# Patient Record
Sex: FEMALE | URBAN - METROPOLITAN AREA
[De-identification: names, ages, dates, MRNs, and addresses within clinical notes are randomized per-mention and may not be internally consistent; named-entity substitution may affect disease eponyms.]

---

## 2024-02-07 ENCOUNTER — OV NP (OUTPATIENT)
Dept: URBAN - METROPOLITAN AREA CLINIC 92 | Facility: CLINIC | Age: 54
End: 2024-02-07
Payer: COMMERCIAL

## 2024-02-07 ENCOUNTER — LAB (OUTPATIENT)
Dept: URBAN - METROPOLITAN AREA CLINIC 92 | Facility: CLINIC | Age: 54
End: 2024-02-07

## 2024-02-07 VITALS
SYSTOLIC BLOOD PRESSURE: 124 MMHG | BODY MASS INDEX: 26.5 KG/M2 | HEART RATE: 77 BPM | TEMPERATURE: 97.3 F | DIASTOLIC BLOOD PRESSURE: 75 MMHG | WEIGHT: 144 LBS | HEIGHT: 62 IN

## 2024-02-07 DIAGNOSIS — K50.012 CROHN'S DISEASE OF SMALL INTESTINE WITH INTESTINAL OBSTRUCTION: ICD-10-CM

## 2024-02-07 DIAGNOSIS — Z12.11 COLON CANCER SCREENING: ICD-10-CM

## 2024-02-07 PROBLEM — 56689002: Status: ACTIVE | Noted: 2024-02-07

## 2024-02-07 PROCEDURE — 99205 OFFICE O/P NEW HI 60 MIN: CPT | Performed by: INTERNAL MEDICINE

## 2024-02-07 RX ORDER — AZATHIOPRINE 50 MG/1
1.5 TABLETS TABLET ORAL DAILY
Qty: 135 TABLET | Refills: 0 | OUTPATIENT
Start: 2024-02-07 | End: 2024-05-07

## 2024-02-07 RX ORDER — AZATHIOPRINE 75 MG/1
AS DIRECTED TABLET ORAL DAILY
Qty: 90 | Refills: 0 | OUTPATIENT
Start: 2024-02-07 | End: 2024-05-07

## 2024-02-07 RX ORDER — AZATHIOPRINE 75 MG/1
TABLET ORAL
Qty: 30 TABLET | Status: ACTIVE | COMMUNITY

## 2024-02-07 RX ORDER — LATANOPROST 50 UG/ML
SOLUTION/ DROPS OPHTHALMIC
Qty: 2.5 MILLILITER | Status: ACTIVE | COMMUNITY

## 2024-02-07 RX ORDER — POLYETHYLENE GLYCOL-3350 AND ELECTROLYTES WITH FLAVOR PACK 240; 5.84; 2.98; 6.72; 22.72 G/278.26G; G/278.26G; G/278.26G; G/278.26G; G/278.26G
4000 ML POWDER, FOR SOLUTION ORAL ONCE
Qty: 4000 ML | Refills: 0 | OUTPATIENT
Start: 2024-02-07

## 2024-02-07 RX ORDER — AMOXICILLIN 500 MG/1
TABLET, FILM COATED ORAL
Qty: 30 TABLET | Status: ON HOLD | COMMUNITY

## 2024-02-07 NOTE — HPI-TODAY'S VISIT:
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  - Dx CD 8/2001 - thought she had a mass on the ovaries, but was found to have CD s/p "a foot of terminal ileum" resection - Has been hospitalized intermittently (lived in [facility]) - has only been in [facility] fot the past 2 years - Had another SB resection in 2/2019, resected 3 feet of small bowel with creation of neoterminal ielum - at that time she was on AZA and prednisone  - Was on prednisone low dose until earlier this year - now having aches in the feet, knees. Was tested for RA which was negative. Has an appt with Rheumatology.  - Maintained on AZA 75mg daily, has been at this dose for the past 3 months - Is having intermittent abdominal pain, RUQ - this is where she typically has her issues - Tried Remicade (rx'd in [facility]) - would only get dosed every 6 months or so; has not had in years  - Has also tried Pentasa - Has never had any inflammation in the colon - She has had colonoscopy in the past which demonstrated inflammatory polyp, thinks her last one was about 6 years ago  - No CRC in the family - No blood in the stool  - No recent imaging  - FCP was high recently - BMs are alternating between constipation and diarrhea

## 2024-02-15 LAB — CALPROTECTIN, FECAL: 43

## 2024-02-19 LAB
6 MMP: <500
6 TG: 182
A/G RATIO: 1.4
ABSOLUTE BASOPHILS: 63
ABSOLUTE EOSINOPHILS: 68
ABSOLUTE LYMPHOCYTES: 1590
ABSOLUTE MONOCYTES: 542
ABSOLUTE NEUTROPHILS: 3437
ALBUMIN: 3.9
ALKALINE PHOSPHATASE: 69
ALT (SGPT): 24
AST (SGOT): 25
BASOPHILS: 1.1
BILIRUBIN, TOTAL: 0.3
BUN/CREATININE RATIO: (no result)
BUN: 13
C-REACTIVE PROTEIN, QUANT: 0.6
CALCIUM: 9.1
CARBON DIOXIDE, TOTAL: 25
CHLORIDE: 107
CREATININE: 0.75
EGFR: 96
EOSINOPHILS: 1.2
FERRITIN, SERUM: 24
GLOBULIN, TOTAL: 2.8
GLUCOSE: 93
HEMATOCRIT: 35.1
HEMOGLOBIN: 11
HEPATITIS B CORE AB TOTAL: (no result)
HEPATITIS B SURFACE ANTIBODY QL: (no result)
HEPATITIS B SURFACE ANTIGEN: (no result)
INTERPRETATION: (no result)
IRON BIND.CAP.(TIBC): 304
IRON SATURATION: 28
IRON: 85
LYMPHOCYTES: 27.9
Lab: NEGATIVE
MCH: 22.5
MCHC: 31.3
MCV: 71.9
MITOGEN-NIL: >10
MONOCYTES: 9.5
MPV: 12.4
NEUTROPHILS: 60.3
PLATELET COUNT: 249
POTASSIUM: 3.6
PROTEIN, TOTAL: 6.7
QUANTIFERON NIL VALUE: 0.04
QUANTIFERON TB1 AG VALUE: 0.03
QUANTIFERON TB2 AG VALUE: 0
QUANTIFERON-TB GOLD PLUS: NEGATIVE
RDW: 15.9
RED BLOOD CELL COUNT: 4.88
SODIUM: 141
VITAMIN B12: 440
VITAMIN D,25-OH,TOTAL,IA: 31
WHITE BLOOD CELL COUNT: 5.7

## 2024-03-08 ENCOUNTER — LAB (OUTPATIENT)
Dept: URBAN - METROPOLITAN AREA CLINIC 4 | Facility: CLINIC | Age: 54
End: 2024-03-08
Payer: COMMERCIAL

## 2024-03-08 ENCOUNTER — COLON (OUTPATIENT)
Dept: URBAN - METROPOLITAN AREA SURGERY CENTER 16 | Facility: SURGERY CENTER | Age: 54
End: 2024-03-08
Payer: COMMERCIAL

## 2024-03-08 DIAGNOSIS — K52.89 OTHER SPECIFIED NONINFECTIVE GASTROENTERITIS AND COLITIS: ICD-10-CM

## 2024-03-08 DIAGNOSIS — K63.3 ULCER OF INTESTINE: ICD-10-CM

## 2024-03-08 PROCEDURE — 88305 TISSUE EXAM BY PATHOLOGIST: CPT | Performed by: PATHOLOGY

## 2024-03-08 PROCEDURE — 88342 IMHCHEM/IMCYTCHM 1ST ANTB: CPT | Performed by: PATHOLOGY

## 2024-03-08 PROCEDURE — 45380 COLONOSCOPY AND BIOPSY: CPT | Performed by: INTERNAL MEDICINE

## 2024-03-08 PROCEDURE — 88341 IMHCHEM/IMCYTCHM EA ADD ANTB: CPT | Performed by: PATHOLOGY

## 2024-03-08 RX ORDER — LATANOPROST 50 UG/ML
SOLUTION/ DROPS OPHTHALMIC
Qty: 2.5 MILLILITER | Status: ACTIVE | COMMUNITY

## 2024-03-08 RX ORDER — AZATHIOPRINE 50 MG/1
1.5 TABLETS TABLET ORAL DAILY
Qty: 135 TABLET | Refills: 0 | Status: ACTIVE | COMMUNITY
Start: 2024-02-07 | End: 2024-05-07

## 2024-03-08 RX ORDER — AMOXICILLIN 500 MG/1
TABLET, FILM COATED ORAL
Qty: 30 TABLET | Status: ON HOLD | COMMUNITY

## 2024-03-08 RX ORDER — POLYETHYLENE GLYCOL-3350 AND ELECTROLYTES WITH FLAVOR PACK 240; 5.84; 2.98; 6.72; 22.72 G/278.26G; G/278.26G; G/278.26G; G/278.26G; G/278.26G
4000 ML POWDER, FOR SOLUTION ORAL ONCE
Qty: 4000 ML | Refills: 0 | Status: ACTIVE | COMMUNITY
Start: 2024-02-07

## 2024-03-08 RX ORDER — AZATHIOPRINE 75 MG/1
AS DIRECTED TABLET ORAL DAILY
Qty: 90 | Refills: 0 | Status: ACTIVE | COMMUNITY
Start: 2024-02-07 | End: 2024-05-07

## 2024-03-08 RX ORDER — AZATHIOPRINE 75 MG/1
TABLET ORAL
Qty: 30 TABLET | Status: ACTIVE | COMMUNITY

## 2024-03-20 ENCOUNTER — OV EP (OUTPATIENT)
Dept: URBAN - METROPOLITAN AREA CLINIC 92 | Facility: CLINIC | Age: 54
End: 2024-03-20
Payer: COMMERCIAL

## 2024-03-20 VITALS
WEIGHT: 140 LBS | HEIGHT: 62 IN | HEART RATE: 70 BPM | DIASTOLIC BLOOD PRESSURE: 66 MMHG | BODY MASS INDEX: 25.76 KG/M2 | TEMPERATURE: 96.8 F | SYSTOLIC BLOOD PRESSURE: 115 MMHG

## 2024-03-20 DIAGNOSIS — K50.012 CROHN'S DISEASE OF SMALL INTESTINE WITH INTESTINAL OBSTRUCTION: ICD-10-CM

## 2024-03-20 PROCEDURE — 99215 OFFICE O/P EST HI 40 MIN: CPT | Performed by: INTERNAL MEDICINE

## 2024-03-20 RX ORDER — AZATHIOPRINE 50 MG/1
1.5 TABLETS TABLET ORAL DAILY
Qty: 135 TABLET | Refills: 0 | Status: ACTIVE | COMMUNITY
Start: 2024-02-07 | End: 2024-05-07

## 2024-03-20 RX ORDER — LATANOPROST 50 UG/ML
SOLUTION/ DROPS OPHTHALMIC
Qty: 2.5 MILLILITER | Status: ACTIVE | COMMUNITY

## 2024-03-20 RX ORDER — USTEKINUMAB 90 MG/ML
AS DIRECTED INJECTION, SOLUTION SUBCUTANEOUS
Qty: 3 UNSPECIFIED | Refills: 3 | OUTPATIENT
Start: 2024-03-20 | End: 2025-03-15

## 2024-03-20 RX ORDER — AZATHIOPRINE 75 MG/1
TABLET ORAL
Qty: 30 TABLET | Status: ACTIVE | COMMUNITY

## 2024-03-20 RX ORDER — USTEKINUMAB 130 MG/26ML
AS DIRECTED SOLUTION INTRAVENOUS ONCE
Qty: 390 MILLIGRAMS | Refills: 0 | OUTPATIENT
Start: 2024-03-20 | End: 2024-03-21

## 2024-03-20 RX ORDER — POLYETHYLENE GLYCOL-3350 AND ELECTROLYTES WITH FLAVOR PACK 240; 5.84; 2.98; 6.72; 22.72 G/278.26G; G/278.26G; G/278.26G; G/278.26G; G/278.26G
4000 ML POWDER, FOR SOLUTION ORAL ONCE
Qty: 4000 ML | Refills: 0 | Status: ON HOLD | COMMUNITY
Start: 2024-02-07

## 2024-03-20 RX ORDER — AMOXICILLIN 500 MG/1
TABLET, FILM COATED ORAL
Qty: 30 TABLET | Status: ON HOLD | COMMUNITY

## 2024-03-20 RX ORDER — AZATHIOPRINE 75 MG/1
AS DIRECTED TABLET ORAL DAILY
Qty: 90 | Refills: 0 | Status: ACTIVE | COMMUNITY
Start: 2024-02-07 | End: 2024-05-07

## 2024-03-20 NOTE — HPI-TODAY'S VISIT:
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  - Dx CD 8/2001 - thought she had a mass on the ovaries, but was found to have CD s/p "a foot of terminal ileum" resection - Has been hospitalized intermittently (lived in [facility]) - has only been in [facility] fot the past 2 years - Had another SB resection in 2/2019, resected 3 feet of small bowel with creation of neoterminal ielum - at that time she was on AZA and prednisone  - Was on prednisone low dose until earlier this year - now having aches in the feet, knees. Was tested for RA which was negative. Has an appt with Rheumatology.  - Maintained on AZA 75mg daily, has been at this dose for the past 3 months - Is having intermittent abdominal pain, RUQ - this is where she typically has her issues - Tried Remicade (rx'd in [facility]) - would only get dosed every 6 months or so; has not had in years  - Has also tried Pentasa - Has never had any inflammation in the colon - She has had colonoscopy in the past which demonstrated inflammatory polyp, thinks her last one was about 6 years ago  - No CRC in the family - No blood in the stool  - No recent imaging  - FCP was high recently - BMs are alternating between constipation and diarrhea  ***3/2024: Colonoscopy March 2024 demonstrated normal mucosa in the colon, but a ulcerated ileocolonic anastomosis as well as a neoterminal ileum with multiple ulcers, spanning about 5 to 10 cm proximal to the anastomosis with a more proximal TI appearing normal.  Biopsies were negative and TI biopsies demonstrated patchy chronic ileitis with ulcer.  CT enterography February 2024 demonstrated status post ileocecectomy with an ileocolic anastomosis at the level of the ascending colon with mild wall thickening of the ascending colon at the anastomosis otherwise no evidence of active inflammation in the small or large bowel.  Also demonstrated a possible small sinus tract in the right mid abdomen versus postsurgical change.  Blood work demonstrated a hemoglobin of 11.0.  CRP 0.6.  Ferritin 24.  Not immune to hepatitis B.  6-TG level was 182.  Currently having 0-4 stools per day. Mild to moderate abdominal pain. No blood. No NV.  Notes a hx of dysphagia requiring dilation last year, none currently.

## 2024-04-15 ENCOUNTER — STI (OUTPATIENT)
Dept: URBAN - METROPOLITAN AREA CLINIC 91 | Facility: CLINIC | Age: 54
End: 2024-04-15
Payer: COMMERCIAL

## 2024-04-15 VITALS
DIASTOLIC BLOOD PRESSURE: 98 MMHG | SYSTOLIC BLOOD PRESSURE: 141 MMHG | TEMPERATURE: 97.7 F | HEIGHT: 62 IN | HEART RATE: 75 BPM | BODY MASS INDEX: 25.65 KG/M2 | WEIGHT: 139.4 LBS

## 2024-04-15 DIAGNOSIS — K50.80 CROHN'S COLITIS: ICD-10-CM

## 2024-04-15 PROCEDURE — 96375 TX/PRO/DX INJ NEW DRUG ADDON: CPT | Performed by: INTERNAL MEDICINE

## 2024-04-15 PROCEDURE — 96413 CHEMO IV INFUSION 1 HR: CPT | Performed by: INTERNAL MEDICINE

## 2024-04-15 RX ORDER — AZATHIOPRINE 50 MG/1
1.5 TABLETS TABLET ORAL DAILY
Qty: 135 TABLET | Refills: 0 | Status: ACTIVE | COMMUNITY
Start: 2024-02-07 | End: 2024-05-07

## 2024-04-15 RX ORDER — LATANOPROST 50 UG/ML
SOLUTION/ DROPS OPHTHALMIC
Qty: 2.5 MILLILITER | Status: ACTIVE | COMMUNITY

## 2024-04-15 RX ORDER — POLYETHYLENE GLYCOL-3350 AND ELECTROLYTES WITH FLAVOR PACK 240; 5.84; 2.98; 6.72; 22.72 G/278.26G; G/278.26G; G/278.26G; G/278.26G; G/278.26G
4000 ML POWDER, FOR SOLUTION ORAL ONCE
Qty: 4000 ML | Refills: 0 | Status: ON HOLD | COMMUNITY
Start: 2024-02-07

## 2024-04-15 RX ORDER — AMOXICILLIN 500 MG/1
TABLET, FILM COATED ORAL
Qty: 30 TABLET | Status: ON HOLD | COMMUNITY

## 2024-04-15 RX ORDER — AZATHIOPRINE 75 MG/1
AS DIRECTED TABLET ORAL DAILY
Qty: 90 | Refills: 0 | Status: ACTIVE | COMMUNITY
Start: 2024-02-07 | End: 2024-05-07

## 2024-04-15 RX ORDER — AZATHIOPRINE 75 MG/1
TABLET ORAL
Qty: 30 TABLET | Status: ACTIVE | COMMUNITY

## 2024-04-15 RX ORDER — USTEKINUMAB 90 MG/ML
AS DIRECTED INJECTION, SOLUTION SUBCUTANEOUS
Qty: 3 UNSPECIFIED | Refills: 3 | Status: ACTIVE | COMMUNITY
Start: 2024-03-20 | End: 2025-03-15

## 2024-05-08 ENCOUNTER — DASHBOARD ENCOUNTERS (OUTPATIENT)
Age: 54
End: 2024-05-08

## 2024-05-08 ENCOUNTER — OFFICE VISIT (OUTPATIENT)
Dept: URBAN - METROPOLITAN AREA CLINIC 92 | Facility: CLINIC | Age: 54
End: 2024-05-08
Payer: COMMERCIAL

## 2024-05-08 VITALS
HEART RATE: 67 BPM | DIASTOLIC BLOOD PRESSURE: 81 MMHG | SYSTOLIC BLOOD PRESSURE: 126 MMHG | HEIGHT: 62 IN | TEMPERATURE: 97.2 F | WEIGHT: 143.6 LBS | BODY MASS INDEX: 26.43 KG/M2

## 2024-05-08 DIAGNOSIS — K50.012 CROHN'S DISEASE OF SMALL INTESTINE WITH INTESTINAL OBSTRUCTION: ICD-10-CM

## 2024-05-08 PROCEDURE — 99214 OFFICE O/P EST MOD 30 MIN: CPT | Performed by: INTERNAL MEDICINE

## 2024-05-08 RX ORDER — POLYETHYLENE GLYCOL-3350 AND ELECTROLYTES WITH FLAVOR PACK 240; 5.84; 2.98; 6.72; 22.72 G/278.26G; G/278.26G; G/278.26G; G/278.26G; G/278.26G
4000 ML POWDER, FOR SOLUTION ORAL ONCE
Qty: 4000 ML | Refills: 0 | Status: ON HOLD | COMMUNITY
Start: 2024-02-07

## 2024-05-08 RX ORDER — LATANOPROST 50 UG/ML
SOLUTION/ DROPS OPHTHALMIC
Qty: 2.5 MILLILITER | Status: ACTIVE | COMMUNITY

## 2024-05-08 RX ORDER — AZATHIOPRINE 75 MG/1
TABLET ORAL
Qty: 30 TABLET | Status: ACTIVE | COMMUNITY

## 2024-05-08 RX ORDER — USTEKINUMAB 90 MG/ML
AS DIRECTED INJECTION, SOLUTION SUBCUTANEOUS
Qty: 3 UNSPECIFIED | Refills: 3 | OUTPATIENT

## 2024-05-08 RX ORDER — AMOXICILLIN 500 MG/1
TABLET, FILM COATED ORAL
Qty: 30 TABLET | Status: ON HOLD | COMMUNITY

## 2024-05-08 RX ORDER — USTEKINUMAB 90 MG/ML
AS DIRECTED INJECTION, SOLUTION SUBCUTANEOUS
Qty: 3 UNSPECIFIED | Refills: 3 | Status: ACTIVE | COMMUNITY
Start: 2024-03-20 | End: 2025-03-15

## 2024-05-21 LAB
A/G RATIO: 1.5
ABSOLUTE BASOPHILS: 48
ABSOLUTE EOSINOPHILS: 10
ABSOLUTE LYMPHOCYTES: 2150
ABSOLUTE MONOCYTES: 653
ABSOLUTE NEUTROPHILS: 6739
ALBUMIN: 4.5
ALKALINE PHOSPHATASE: 71
ALT (SGPT): 32
AST (SGOT): 30
BASOPHILS: 0.5
BILIRUBIN, TOTAL: 0.6
BUN/CREATININE RATIO: (no result)
BUN: 15
C-REACTIVE PROTEIN, QUANT: <3
CALCIUM: 9.3
CALPROTECTIN, FECAL: 106
CARBON DIOXIDE, TOTAL: 25
CHLORIDE: 103
CREATININE: 0.72
EGFR: 101
EOSINOPHILS: 0.1
GLOBULIN, TOTAL: 3
GLUCOSE: 84
HEMATOCRIT: 39.5
HEMOGLOBIN: 12
LYMPHOCYTES: 22.4
MCH: 22.3
MCHC: 30.4
MCV: 73.3
MONOCYTES: 6.8
MPV: 12.6
NEUTROPHILS: 70.2
PLATELET COUNT: 240
POTASSIUM: 3.8
PROTEIN, TOTAL: 7.5
RDW: 17.4
RED BLOOD CELL COUNT: 5.39
SODIUM: 139
WHITE BLOOD CELL COUNT: 9.6

## 2024-07-10 ENCOUNTER — OFFICE VISIT (OUTPATIENT)
Dept: URBAN - METROPOLITAN AREA CLINIC 92 | Facility: CLINIC | Age: 54
End: 2024-07-10
Payer: COMMERCIAL

## 2024-07-10 VITALS
TEMPERATURE: 96.6 F | SYSTOLIC BLOOD PRESSURE: 127 MMHG | BODY MASS INDEX: 26.68 KG/M2 | HEART RATE: 64 BPM | DIASTOLIC BLOOD PRESSURE: 79 MMHG | HEIGHT: 62 IN | WEIGHT: 145 LBS

## 2024-07-10 DIAGNOSIS — K50.012 CROHN'S DISEASE OF SMALL INTESTINE WITH INTESTINAL OBSTRUCTION: ICD-10-CM

## 2024-07-10 PROCEDURE — 99214 OFFICE O/P EST MOD 30 MIN: CPT | Performed by: INTERNAL MEDICINE

## 2024-07-10 RX ORDER — LATANOPROST 50 UG/ML
SOLUTION/ DROPS OPHTHALMIC
Qty: 2.5 MILLILITER | Status: ACTIVE | COMMUNITY

## 2024-07-10 RX ORDER — AMOXICILLIN 500 MG/1
TABLET, FILM COATED ORAL
Qty: 30 TABLET | Status: ON HOLD | COMMUNITY

## 2024-07-10 RX ORDER — USTEKINUMAB 90 MG/ML
AS DIRECTED INJECTION, SOLUTION SUBCUTANEOUS
Qty: 3 UNSPECIFIED | Refills: 3 | OUTPATIENT

## 2024-07-10 RX ORDER — USTEKINUMAB 90 MG/ML
AS DIRECTED INJECTION, SOLUTION SUBCUTANEOUS
Qty: 3 UNSPECIFIED | Refills: 3 | Status: ACTIVE | COMMUNITY

## 2024-07-10 RX ORDER — POLYETHYLENE GLYCOL-3350 AND ELECTROLYTES WITH FLAVOR PACK 240; 5.84; 2.98; 6.72; 22.72 G/278.26G; G/278.26G; G/278.26G; G/278.26G; G/278.26G
4000 ML POWDER, FOR SOLUTION ORAL ONCE
Qty: 4000 ML | Refills: 0 | Status: ON HOLD | COMMUNITY
Start: 2024-02-07

## 2024-07-10 RX ORDER — AZATHIOPRINE 75 MG/1
TABLET ORAL
Qty: 30 TABLET | Status: ACTIVE | COMMUNITY

## 2024-07-10 NOTE — HPI-TODAY'S VISIT:
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  - Dx CD 8/2001 - thought she had a mass on the ovaries, but was found to have CD s/p "a foot of terminal ileum" resection - Has been hospitalized intermittently (lived in [facility]) - has only been in [facility] fot the past 2 years - Had another SB resection in 2/2019, resected 3 feet of small bowel with creation of neoterminal ielum - at that time she was on AZA and prednisone  - Was on prednisone low dose until earlier this year - now having aches in the feet, knees. Was tested for RA which was negative. Has an appt with Rheumatology.  - Maintained on AZA 75mg daily, has been at this dose for the past 3 months - Is having intermittent abdominal pain, RUQ - this is where she typically has her issues - Tried Remicade (rx'd in [facility]) - would only get dosed every 6 months or so; has not had in years  - Has also tried Pentasa - Has never had any inflammation in the colon - She has had colonoscopy in the past which demonstrated inflammatory polyp, thinks her last one was about 6 years ago  - No CRC in the family - No blood in the stool  - No recent imaging  - FCP was high recently - BMs are alternating between constipation and diarrhea  ***3/2024: Colonoscopy March 2024 demonstrated normal mucosa in the colon, but a ulcerated ileocolonic anastomosis as well as a neoterminal ileum with multiple ulcers, spanning about 5 to 10 cm proximal to the anastomosis with a more proximal TI appearing normal.  Biopsies were negative and TI biopsies demonstrated patchy chronic ileitis with ulcer.  CT enterography February 2024 demonstrated status post ileocecectomy with an ileocolic anastomosis at the level of the ascending colon with mild wall thickening of the ascending colon at the anastomosis otherwise no evidence of active inflammation in the small or large bowel.  Also demonstrated a possible small sinus tract in the right mid abdomen versus postsurgical change.  Blood work demonstrated a hemoglobin of 11.0.  CRP 0.6.  Ferritin 24.  Not immune to hepatitis B.  6-TG level was 182.  Currently having 0-4 stools per day. Mild to moderate abdominal pain. No blood. No NV.  Notes a hx of dysphagia requiring dilation last year, none currently.  ***5/2024: Started Stelara 4/2024, had her infusion and will start maintenance in June 2024. Overall feels fatigued, having some diarrhea. Injured her hand at work.  ***7/2024: Doing well on Stelara, improving her abdominal pain. No diarrhea. Having hand issues which are frustraing her. Off AZA.

## 2024-08-12 ENCOUNTER — OFFICE VISIT (OUTPATIENT)
Dept: URBAN - METROPOLITAN AREA CLINIC 92 | Facility: CLINIC | Age: 54
End: 2024-08-12

## 2025-01-07 ENCOUNTER — OFFICE VISIT (OUTPATIENT)
Dept: URBAN - METROPOLITAN AREA CLINIC 92 | Facility: CLINIC | Age: 55
End: 2025-01-07
Payer: COMMERCIAL

## 2025-01-07 VITALS — HEIGHT: 62 IN | BODY MASS INDEX: 27.6 KG/M2 | TEMPERATURE: 96.8 F | WEIGHT: 150 LBS

## 2025-01-07 DIAGNOSIS — K22.2 ESOPHAGEAL STRICTURE: ICD-10-CM

## 2025-01-07 DIAGNOSIS — K50.012 CROHN'S DISEASE OF SMALL INTESTINE WITH INTESTINAL OBSTRUCTION: ICD-10-CM

## 2025-01-07 PROBLEM — 63305008: Status: ACTIVE | Noted: 2025-01-07

## 2025-01-07 PROCEDURE — 99215 OFFICE O/P EST HI 40 MIN: CPT | Performed by: INTERNAL MEDICINE

## 2025-01-07 PROCEDURE — G2211 COMPLEX E/M VISIT ADD ON: HCPCS | Performed by: INTERNAL MEDICINE

## 2025-01-07 RX ORDER — OMEPRAZOLE 20 MG/1
1 CAPSULE 30 MINUTES BEFORE MORNING MEAL TABLET, DELAYED RELEASE ORAL ONCE A DAY
Qty: 90 | Refills: 3 | OUTPATIENT
Start: 2025-01-07

## 2025-01-07 RX ORDER — USTEKINUMAB 90 MG/ML
AS DIRECTED INJECTION, SOLUTION SUBCUTANEOUS
Qty: 3 UNSPECIFIED | Refills: 3 | OUTPATIENT

## 2025-01-07 RX ORDER — CHOLESTYRAMINE POWDER FOR SUSPENSION 4 G/8.78G
1 PACKET MIXED WITH WATER OR NON-CARBONATED DRINK POWDER, FOR SUSPENSION ORAL
Qty: 60 PACKETS | Refills: 11 | OUTPATIENT
Start: 2025-01-07

## 2025-01-07 RX ORDER — AZATHIOPRINE 75 MG/1
TABLET ORAL
Qty: 30 TABLET | Status: ACTIVE | COMMUNITY

## 2025-01-07 RX ORDER — AMOXICILLIN 500 MG/1
TABLET, FILM COATED ORAL
Qty: 30 TABLET | Status: ACTIVE | COMMUNITY

## 2025-01-07 RX ORDER — USTEKINUMAB 90 MG/ML
AS DIRECTED INJECTION, SOLUTION SUBCUTANEOUS
Qty: 3 UNSPECIFIED | Refills: 3 | Status: ACTIVE | COMMUNITY

## 2025-01-07 RX ORDER — LATANOPROST 50 UG/ML
SOLUTION/ DROPS OPHTHALMIC
Qty: 2.5 MILLILITER | Status: ACTIVE | COMMUNITY

## 2025-01-07 NOTE — HPI-TODAY'S VISIT:
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  - Dx CD 8/2001 - thought she had a mass on the ovaries, but was found to have CD s/p "a foot of terminal ileum" resection - Has been hospitalized intermittently (lived in [facility]) - has only been in [facility] fot the past 2 years - Had another SB resection in 2/2019, resected 3 feet of small bowel with creation of neoterminal ielum - at that time she was on AZA and prednisone  - Was on prednisone low dose until earlier this year - now having aches in the feet, knees. Was tested for RA which was negative. Has an appt with Rheumatology.  - Maintained on AZA 75mg daily, has been at this dose for the past 3 months - Is having intermittent abdominal pain, RUQ - this is where she typically has her issues - Tried Remicade (rx'd in [facility]) - would only get dosed every 6 months or so; has not had in years  - Has also tried Pentasa - Has never had any inflammation in the colon - She has had colonoscopy in the past which demonstrated inflammatory polyp, thinks her last one was about 6 years ago  - No CRC in the family - No blood in the stool  - No recent imaging  - FCP was high recently - BMs are alternating between constipation and diarrhea  ***3/2024: Colonoscopy March 2024 demonstrated normal mucosa in the colon, but a ulcerated ileocolonic anastomosis as well as a neoterminal ileum with multiple ulcers, spanning about 5 to 10 cm proximal to the anastomosis with a more proximal TI appearing normal.  Biopsies were negative and TI biopsies demonstrated patchy chronic ileitis with ulcer.  CT enterography February 2024 demonstrated status post ileocecectomy with an ileocolic anastomosis at the level of the ascending colon with mild wall thickening of the ascending colon at the anastomosis otherwise no evidence of active inflammation in the small or large bowel.  Also demonstrated a possible small sinus tract in the right mid abdomen versus postsurgical change.  Blood work demonstrated a hemoglobin of 11.0.  CRP 0.6.  Ferritin 24.  Not immune to hepatitis B.  6-TG level was 182.  Currently having 0-4 stools per day. Mild to moderate abdominal pain. No blood. No NV.  Notes a hx of dysphagia requiring dilation last year, none currently.  ***5/2024: Started Stelara 4/2024, had her infusion and will start maintenance in June 2024. Overall feels fatigued, having some diarrhea. Injured her hand at work.  ***7/2024: Doing well on Stelara, improving her abdominal pain. No diarrhea. Having hand issues which are frustraing her. Off AZA.  ***1/2025: Over the past month has started to notice diarrhea for a day here and there. Resolves w/charcoal but then recurs. Feels like it is worse at times closer to when she is due for her infusion. No blood. No dysphagia but notes HB. Has required dilations in the past.

## 2025-01-20 ENCOUNTER — WEB ENCOUNTER (OUTPATIENT)
Dept: URBAN - METROPOLITAN AREA CLINIC 92 | Facility: CLINIC | Age: 55
End: 2025-01-20

## 2025-01-21 ENCOUNTER — TELEPHONE ENCOUNTER (OUTPATIENT)
Dept: URBAN - METROPOLITAN AREA CLINIC 92 | Facility: CLINIC | Age: 55
End: 2025-01-21

## 2025-01-21 LAB
CALPROTECTIN, FECAL: 149
CLOSTRIDIUM DIFFICILE TOXINB,QL REAL TIME PCR: NOT DETECTED

## 2025-01-27 ENCOUNTER — TELEPHONE ENCOUNTER (OUTPATIENT)
Dept: URBAN - METROPOLITAN AREA CLINIC 92 | Facility: CLINIC | Age: 55
End: 2025-01-27

## 2025-01-31 ENCOUNTER — TELEPHONE ENCOUNTER (OUTPATIENT)
Dept: URBAN - METROPOLITAN AREA CLINIC 92 | Facility: CLINIC | Age: 55
End: 2025-01-31

## 2025-01-31 ENCOUNTER — LAB OUTSIDE AN ENCOUNTER (OUTPATIENT)
Dept: URBAN - METROPOLITAN AREA CLINIC 92 | Facility: CLINIC | Age: 55
End: 2025-01-31

## 2025-02-03 ENCOUNTER — TELEPHONE ENCOUNTER (OUTPATIENT)
Dept: URBAN - METROPOLITAN AREA CLINIC 92 | Facility: CLINIC | Age: 55
End: 2025-02-03

## 2025-02-03 RX ORDER — USTEKINUMAB 90 MG/ML
AS DIRECTED INJECTION, SOLUTION SUBCUTANEOUS
Qty: 2 | Refills: 3

## 2025-02-05 ENCOUNTER — TELEPHONE ENCOUNTER (OUTPATIENT)
Dept: URBAN - METROPOLITAN AREA CLINIC 92 | Facility: CLINIC | Age: 55
End: 2025-02-05

## 2025-02-11 ENCOUNTER — WEB ENCOUNTER (OUTPATIENT)
Dept: URBAN - METROPOLITAN AREA CLINIC 92 | Facility: CLINIC | Age: 55
End: 2025-02-11

## 2025-02-12 ENCOUNTER — LAB OUTSIDE AN ENCOUNTER (OUTPATIENT)
Dept: URBAN - METROPOLITAN AREA CLINIC 92 | Facility: CLINIC | Age: 55
End: 2025-02-12

## 2025-03-05 ENCOUNTER — ERX REFILL RESPONSE (OUTPATIENT)
Dept: URBAN - METROPOLITAN AREA CLINIC 92 | Facility: CLINIC | Age: 55
End: 2025-03-05

## 2025-03-05 RX ORDER — USTEKINUMAB 90 MG/ML
INJECT 1 SYRINGE UNDER THE SKIN EVERY 8 WEEKS INJECTION, SOLUTION SUBCUTANEOUS
Qty: 1 | Refills: 8 | OUTPATIENT

## 2025-03-05 RX ORDER — USTEKINUMAB 90 MG/ML
INJECT 1 SYRINGE UNDER THE SKIN EVERY 8 WEEKS INJECTION, SOLUTION SUBCUTANEOUS
Qty: 1 | Refills: 7 | OUTPATIENT

## 2025-03-12 ENCOUNTER — TELEPHONE ENCOUNTER (OUTPATIENT)
Dept: URBAN - METROPOLITAN AREA CLINIC 92 | Facility: CLINIC | Age: 55
End: 2025-03-12

## 2025-04-01 ENCOUNTER — TELEPHONE ENCOUNTER (OUTPATIENT)
Dept: URBAN - METROPOLITAN AREA CLINIC 92 | Facility: CLINIC | Age: 55
End: 2025-04-01

## 2025-04-02 ENCOUNTER — OFFICE VISIT (OUTPATIENT)
Dept: URBAN - METROPOLITAN AREA CLINIC 124 | Facility: CLINIC | Age: 55
End: 2025-04-02

## 2025-04-02 RX ORDER — LATANOPROST 50 UG/ML
SOLUTION/ DROPS OPHTHALMIC
Qty: 2.5 MILLILITER | COMMUNITY

## 2025-04-02 RX ORDER — USTEKINUMAB 90 MG/ML
INJECT 1 SYRINGE UNDER THE SKIN EVERY 8 WEEKS INJECTION, SOLUTION SUBCUTANEOUS
Qty: 1 | Refills: 7 | COMMUNITY

## 2025-04-02 RX ORDER — USTEKINUMAB 90 MG/ML
AS DIRECTED INJECTION, SOLUTION SUBCUTANEOUS
Qty: 2 | Refills: 3 | COMMUNITY

## 2025-04-02 RX ORDER — AZATHIOPRINE 75 MG/1
TABLET ORAL
Qty: 30 TABLET | COMMUNITY

## 2025-04-02 RX ORDER — AMOXICILLIN 500 MG/1
TABLET, FILM COATED ORAL
Qty: 30 TABLET | COMMUNITY

## 2025-04-02 RX ORDER — OMEPRAZOLE 20 MG/1
1 CAPSULE 30 MINUTES BEFORE MORNING MEAL TABLET, DELAYED RELEASE ORAL ONCE A DAY
Qty: 90 | Refills: 3 | COMMUNITY
Start: 2025-01-07

## 2025-04-02 RX ORDER — CHOLESTYRAMINE POWDER FOR SUSPENSION 4 G/8.78G
1 PACKET MIXED WITH WATER OR NON-CARBONATED DRINK POWDER, FOR SUSPENSION ORAL
Qty: 60 PACKETS | Refills: 11 | COMMUNITY
Start: 2025-01-07

## 2025-04-08 ENCOUNTER — WEB ENCOUNTER (OUTPATIENT)
Dept: URBAN - METROPOLITAN AREA CLINIC 92 | Facility: CLINIC | Age: 55
End: 2025-04-08

## 2025-04-15 ENCOUNTER — OFFICE VISIT (OUTPATIENT)
Dept: URBAN - METROPOLITAN AREA CLINIC 92 | Facility: CLINIC | Age: 55
End: 2025-04-15
Payer: COMMERCIAL

## 2025-04-15 DIAGNOSIS — K50.012 CROHN'S DISEASE OF SMALL INTESTINE WITH INTESTINAL OBSTRUCTION: ICD-10-CM

## 2025-04-15 DIAGNOSIS — K22.2 ESOPHAGEAL STRICTURE: ICD-10-CM

## 2025-04-15 PROCEDURE — G2211 COMPLEX E/M VISIT ADD ON: HCPCS | Performed by: INTERNAL MEDICINE

## 2025-04-15 PROCEDURE — 99215 OFFICE O/P EST HI 40 MIN: CPT | Performed by: INTERNAL MEDICINE

## 2025-04-15 RX ORDER — CHOLESTYRAMINE POWDER FOR SUSPENSION 4 G/8.78G
1 PACKET MIXED WITH WATER OR NON-CARBONATED DRINK POWDER, FOR SUSPENSION ORAL
Qty: 60 PACKETS | Refills: 11 | Status: ACTIVE | COMMUNITY
Start: 2025-01-07

## 2025-04-15 RX ORDER — USTEKINUMAB 90 MG/ML
AS DIRECTED INJECTION, SOLUTION SUBCUTANEOUS
Qty: 2 | Refills: 3 | Status: ACTIVE | COMMUNITY

## 2025-04-15 RX ORDER — CHOLESTYRAMINE POWDER FOR SUSPENSION 4 G/8.78G
1 PACKET MIXED WITH WATER OR NON-CARBONATED DRINK POWDER, FOR SUSPENSION ORAL
Qty: 60 PACKETS | Refills: 11 | OUTPATIENT
Start: 2025-04-15

## 2025-04-15 RX ORDER — OMEPRAZOLE 20 MG/1
1 CAPSULE 30 MINUTES BEFORE MORNING MEAL TABLET, DELAYED RELEASE ORAL ONCE A DAY
Qty: 90 | Refills: 3 | OUTPATIENT
Start: 2025-04-15

## 2025-04-15 RX ORDER — LATANOPROST 50 UG/ML
SOLUTION/ DROPS OPHTHALMIC
Qty: 2.5 MILLILITER | Status: ACTIVE | COMMUNITY

## 2025-04-15 RX ORDER — OMEPRAZOLE 20 MG/1
1 CAPSULE 30 MINUTES BEFORE MORNING MEAL TABLET, DELAYED RELEASE ORAL ONCE A DAY
Qty: 90 | Refills: 3 | Status: ACTIVE | COMMUNITY
Start: 2025-01-07

## 2025-04-15 RX ORDER — USTEKINUMAB 90 MG/ML
AS DIRECTED INJECTION, SOLUTION SUBCUTANEOUS
Qty: 3 UNSPECIFIED | Refills: 3 | OUTPATIENT
Start: 2025-04-15 | End: 2026-04-10

## 2025-04-15 NOTE — HPI-TODAY'S VISIT:
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  - Dx CD 8/2001 - thought she had a mass on the ovaries, but was found to have CD s/p "a foot of terminal ileum" resection - Has been hospitalized intermittently (lived in [facility]) - has only been in [facility] fot the past 2 years - Had another SB resection in 2/2019, resected 3 feet of small bowel with creation of neoterminal ielum - at that time she was on AZA and prednisone  - Was on prednisone low dose until earlier this year - now having aches in the feet, knees. Was tested for RA which was negative. Has an appt with Rheumatology.  - Maintained on AZA 75mg daily, has been at this dose for the past 3 months - Is having intermittent abdominal pain, RUQ - this is where she typically has her issues - Tried Remicade (rx'd in [facility]) - would only get dosed every 6 months or so; has not had in years  - Has also tried Pentasa - Has never had any inflammation in the colon - She has had colonoscopy in the past which demonstrated inflammatory polyp, thinks her last one was about 6 years ago  - No CRC in the family - No blood in the stool  - No recent imaging  - FCP was high recently - BMs are alternating between constipation and diarrhea  ***3/2024: Colonoscopy March 2024 demonstrated normal mucosa in the colon, but a ulcerated ileocolonic anastomosis as well as a neoterminal ileum with multiple ulcers, spanning about 5 to 10 cm proximal to the anastomosis with a more proximal TI appearing normal.  Biopsies were negative and TI biopsies demonstrated patchy chronic ileitis with ulcer.  CT enterography February 2024 demonstrated status post ileocecectomy with an ileocolic anastomosis at the level of the ascending colon with mild wall thickening of the ascending colon at the anastomosis otherwise no evidence of active inflammation in the small or large bowel.  Also demonstrated a possible small sinus tract in the right mid abdomen versus postsurgical change.  Blood work demonstrated a hemoglobin of 11.0.  CRP 0.6.  Ferritin 24.  Not immune to hepatitis B.  6-TG level was 182.  Currently having 0-4 stools per day. Mild to moderate abdominal pain. No blood. No NV.  Notes a hx of dysphagia requiring dilation last year, none currently.  ***5/2024: Started Stelara 4/2024, had her infusion and will start maintenance in June 2024. Overall feels fatigued, having some diarrhea. Injured her hand at work.  ***7/2024: Doing well on Stelara, improving her abdominal pain. No diarrhea. Having hand issues which are frustraing her. Off AZA.  ***1/2025: Over the past month has started to notice diarrhea for a day here and there. Resolves w/charcoal but then recurs. Feels like it is worse at times closer to when she is due for her infusion. No blood. No dysphagia but notes HB. Has required dilations in the past.  ***4/2025: Ongoing diarrhea. Never having formed stools, always loose. Feels drained. Has had trouble getting the q6w infusions with [facility].

## 2025-04-17 ENCOUNTER — TELEPHONE ENCOUNTER (OUTPATIENT)
Dept: URBAN - METROPOLITAN AREA CLINIC 92 | Facility: CLINIC | Age: 55
End: 2025-04-17

## 2025-04-17 RX ORDER — USTEKINUMAB 90 MG/ML
AS DIRECTED INJECTION, SOLUTION SUBCUTANEOUS
Qty: 2 | Refills: 3 | OUTPATIENT
Start: 2025-04-17 | End: 2025-10-02

## 2025-04-23 ENCOUNTER — TELEPHONE ENCOUNTER (OUTPATIENT)
Dept: URBAN - METROPOLITAN AREA CLINIC 92 | Facility: CLINIC | Age: 55
End: 2025-04-23

## 2025-04-23 RX ORDER — USTEKINUMAB 90 MG/ML
AS DIRECTED INJECTION, SOLUTION SUBCUTANEOUS
Qty: 2 | Refills: 3
Start: 2025-04-17 | End: 2025-10-08

## 2025-04-23 RX ORDER — USTEKINUMAB 90 MG/ML
AS DIRECTED INJECTION, SOLUTION SUBCUTANEOUS
OUTPATIENT
Start: 2025-04-15 | End: 2026-04-10

## 2025-04-24 ENCOUNTER — ERX REFILL RESPONSE (OUTPATIENT)
Dept: URBAN - METROPOLITAN AREA CLINIC 92 | Facility: CLINIC | Age: 55
End: 2025-04-24

## 2025-04-24 ENCOUNTER — TELEPHONE ENCOUNTER (OUTPATIENT)
Dept: URBAN - METROPOLITAN AREA CLINIC 92 | Facility: CLINIC | Age: 55
End: 2025-04-24

## 2025-04-24 RX ORDER — USTEKINUMAB 90 MG/ML
AS DIRECTED INJECTION, SOLUTION SUBCUTANEOUS
Qty: 2 | Refills: 3 | OUTPATIENT